# Patient Record
Sex: FEMALE | Race: ASIAN | NOT HISPANIC OR LATINO
[De-identification: names, ages, dates, MRNs, and addresses within clinical notes are randomized per-mention and may not be internally consistent; named-entity substitution may affect disease eponyms.]

---

## 2018-02-03 ENCOUNTER — TRANSCRIPTION ENCOUNTER (OUTPATIENT)
Age: 48
End: 2018-02-03

## 2019-09-26 ENCOUNTER — APPOINTMENT (OUTPATIENT)
Dept: SURGICAL ONCOLOGY | Facility: CLINIC | Age: 49
End: 2019-09-26
Payer: COMMERCIAL

## 2019-09-26 VITALS
RESPIRATION RATE: 16 BRPM | BODY MASS INDEX: 31.99 KG/M2 | SYSTOLIC BLOOD PRESSURE: 169 MMHG | OXYGEN SATURATION: 95 % | WEIGHT: 192 LBS | DIASTOLIC BLOOD PRESSURE: 117 MMHG | HEIGHT: 65 IN | HEART RATE: 72 BPM

## 2019-09-26 PROCEDURE — 99244 OFF/OP CNSLTJ NEW/EST MOD 40: CPT

## 2019-10-08 ENCOUNTER — FORM ENCOUNTER (OUTPATIENT)
Age: 49
End: 2019-10-08

## 2019-10-09 ENCOUNTER — OUTPATIENT (OUTPATIENT)
Dept: OUTPATIENT SERVICES | Facility: HOSPITAL | Age: 49
LOS: 1 days | End: 2019-10-09
Payer: COMMERCIAL

## 2019-10-09 ENCOUNTER — APPOINTMENT (OUTPATIENT)
Dept: ULTRASOUND IMAGING | Facility: CLINIC | Age: 49
End: 2019-10-09
Payer: COMMERCIAL

## 2019-10-09 DIAGNOSIS — Z00.8 ENCOUNTER FOR OTHER GENERAL EXAMINATION: ICD-10-CM

## 2019-10-09 PROCEDURE — 76536 US EXAM OF HEAD AND NECK: CPT

## 2019-10-09 PROCEDURE — 76536 US EXAM OF HEAD AND NECK: CPT | Mod: 26

## 2019-10-22 ENCOUNTER — FORM ENCOUNTER (OUTPATIENT)
Age: 49
End: 2019-10-22

## 2019-10-23 ENCOUNTER — APPOINTMENT (OUTPATIENT)
Dept: CT IMAGING | Facility: CLINIC | Age: 49
End: 2019-10-23
Payer: COMMERCIAL

## 2019-10-23 ENCOUNTER — OUTPATIENT (OUTPATIENT)
Dept: OUTPATIENT SERVICES | Facility: HOSPITAL | Age: 49
LOS: 1 days | End: 2019-10-23
Payer: COMMERCIAL

## 2019-10-23 DIAGNOSIS — R22.1 LOCALIZED SWELLING, MASS AND LUMP, NECK: ICD-10-CM

## 2019-10-23 PROCEDURE — 70491 CT SOFT TISSUE NECK W/DYE: CPT

## 2019-10-23 PROCEDURE — 70491 CT SOFT TISSUE NECK W/DYE: CPT | Mod: 26

## 2019-11-22 ENCOUNTER — APPOINTMENT (OUTPATIENT)
Dept: CT IMAGING | Facility: CLINIC | Age: 49
End: 2019-11-22

## 2019-11-29 ENCOUNTER — APPOINTMENT (OUTPATIENT)
Dept: INTERNAL MEDICINE | Facility: CLINIC | Age: 49
End: 2019-11-29
Payer: COMMERCIAL

## 2019-11-29 VITALS
WEIGHT: 196 LBS | SYSTOLIC BLOOD PRESSURE: 120 MMHG | HEART RATE: 101 BPM | BODY MASS INDEX: 32.65 KG/M2 | OXYGEN SATURATION: 98 % | TEMPERATURE: 97.6 F | HEIGHT: 65 IN | DIASTOLIC BLOOD PRESSURE: 84 MMHG

## 2019-11-29 DIAGNOSIS — Z80.0 FAMILY HISTORY OF MALIGNANT NEOPLASM OF DIGESTIVE ORGANS: ICD-10-CM

## 2019-11-29 DIAGNOSIS — Z90.711 ACQUIRED ABSENCE OF UTERUS WITH REMAINING CERVICAL STUMP: ICD-10-CM

## 2019-11-29 DIAGNOSIS — R73.03 PREDIABETES.: ICD-10-CM

## 2019-11-29 DIAGNOSIS — Z80.7 FAMILY HISTORY OF OTHER MALIGNANT NEOPLASMS OF LYMPHOID, HEMATOPOIETIC AND RELATED TISSUES: ICD-10-CM

## 2019-11-29 PROCEDURE — G0442 ANNUAL ALCOHOL SCREEN 15 MIN: CPT

## 2019-11-29 PROCEDURE — G0444 DEPRESSION SCREEN ANNUAL: CPT

## 2019-11-29 PROCEDURE — 99386 PREV VISIT NEW AGE 40-64: CPT | Mod: 25

## 2019-11-29 NOTE — HEALTH RISK ASSESSMENT
[Good] : ~his/her~  mood as  good [Yes] : Yes [Monthly or less (1 pt)] : Monthly or less (1 point) [1 or 2 (0 pts)] : 1 or 2 (0 points) [Never (0 pts)] : Never (0 points) [No falls in past year] : Patient reported no falls in the past year [0] : 2) Feeling down, depressed, or hopeless: Not at all (0) [Patient reported mammogram was normal] : Patient reported mammogram was normal [Patient reported colonoscopy was normal] : Patient reported colonoscopy was normal [Patient reported PAP Smear was normal] : Patient reported PAP Smear was normal [Alone] : lives alone [# Of Children ___] : has [unfilled] children [Employed] : employed [] :  [Fully functional (using the telephone, shopping, preparing meals, housekeeping, doing laundry, using] : Fully functional and needs no help or supervision to perform IADLs (using the telephone, shopping, preparing meals, housekeeping, doing laundry, using transportation, managing medications and managing finances) [Fully functional (bathing, dressing, toileting, transferring, walking, feeding)] : Fully functional (bathing, dressing, toileting, transferring, walking, feeding) [] : No [de-identified] : sedentary  [Reports changes in hearing] : Reports no changes in hearing [Reports changes in dental health] : Reports no changes in dental health [MammogramDate] : 11/11/19 [Reports changes in vision] : Reports no changes in vision [ColonoscopyDate] : 2017  [ColonoscopyComments] : d [MammogramComments] : Dr Pa  [PapSmearDate] : 2018 [FreeTextEntry2] :

## 2019-11-29 NOTE — HISTORY OF PRESENT ILLNESS
[de-identified] : new pt \par need to estabilish care \par \par was seeing ortho in summer Dr Ken hickman for cspine pain did MRi showed incidental spot on neck left - saw surgery onocologist - Dr Espinal - did CT neck with contarast - soft tissue density 1.7/ 1 cm -with fatty hilum likely small LN recommended MRI with contrast has appt 12/7th

## 2019-11-29 NOTE — ASSESSMENT
[FreeTextEntry1] : BMI -32 / predm \par -BW report requested pt did last month whole panel with old doctor \par - discussed caloric control , portion control , weight loss, increase physical activity\par \par Left neck mass- keep appt for MRI neck with contrast next lorene \par -followed by Surgery \par \par HCM \par PAP- 2019 up to date as per pt \par mammo 10/2019 - negative \par Flu vaccine 10/2019 \par Colonoscope 2017 neg as per pt records requested \par tdap - will check and inform me \par \par rtc 2 months

## 2019-11-29 NOTE — PHYSICAL EXAM
[No Acute Distress] : no acute distress [Well Nourished] : well nourished [Well-Appearing] : well-appearing [Well Developed] : well developed [Normal Sclera/Conjunctiva] : normal sclera/conjunctiva [PERRL] : pupils equal round and reactive to light [EOMI] : extraocular movements intact [Normal Outer Ear/Nose] : the outer ears and nose were normal in appearance [Normal Oropharynx] : the oropharynx was normal [No Lymphadenopathy] : no lymphadenopathy [No JVD] : no jugular venous distention [Supple] : supple [No Respiratory Distress] : no respiratory distress  [Thyroid Normal, No Nodules] : the thyroid was normal and there were no nodules present [No Accessory Muscle Use] : no accessory muscle use [Clear to Auscultation] : lungs were clear to auscultation bilaterally [Normal Rate] : normal rate  [Regular Rhythm] : with a regular rhythm [No Carotid Bruits] : no carotid bruits [No Murmur] : no murmur heard [Normal S1, S2] : normal S1 and S2 [No Abdominal Bruit] : a ~M bruit was not heard ~T in the abdomen [Pedal Pulses Present] : the pedal pulses are present [No Varicosities] : no varicosities [No Extremity Clubbing/Cyanosis] : no extremity clubbing/cyanosis [No Edema] : there was no peripheral edema [No Palpable Aorta] : no palpable aorta [Non Tender] : non-tender [Soft] : abdomen soft [Non-distended] : non-distended [No HSM] : no HSM [No Masses] : no abdominal mass palpated [Normal Bowel Sounds] : normal bowel sounds [Normal Posterior Cervical Nodes] : no posterior cervical lymphadenopathy [No Spinal Tenderness] : no spinal tenderness [No CVA Tenderness] : no CVA  tenderness [Normal Anterior Cervical Nodes] : no anterior cervical lymphadenopathy [Grossly Normal Strength/Tone] : grossly normal strength/tone [No Joint Swelling] : no joint swelling [No Rash] : no rash [Coordination Grossly Intact] : coordination grossly intact [No Focal Deficits] : no focal deficits [Normal Gait] : normal gait [Deep Tendon Reflexes (DTR)] : deep tendon reflexes were 2+ and symmetric [Normal Affect] : the affect was normal [Normal Insight/Judgement] : insight and judgment were intact

## 2019-12-06 ENCOUNTER — FORM ENCOUNTER (OUTPATIENT)
Age: 49
End: 2019-12-06

## 2019-12-07 ENCOUNTER — OUTPATIENT (OUTPATIENT)
Dept: OUTPATIENT SERVICES | Facility: HOSPITAL | Age: 49
LOS: 1 days | End: 2019-12-07
Payer: COMMERCIAL

## 2019-12-07 ENCOUNTER — APPOINTMENT (OUTPATIENT)
Dept: MRI IMAGING | Facility: CLINIC | Age: 49
End: 2019-12-07
Payer: COMMERCIAL

## 2019-12-07 DIAGNOSIS — R22.1 LOCALIZED SWELLING, MASS AND LUMP, NECK: ICD-10-CM

## 2019-12-07 PROCEDURE — A9585: CPT

## 2019-12-07 PROCEDURE — 70543 MRI ORBT/FAC/NCK W/O &W/DYE: CPT | Mod: 26

## 2019-12-07 PROCEDURE — 70543 MRI ORBT/FAC/NCK W/O &W/DYE: CPT

## 2020-06-22 ENCOUNTER — APPOINTMENT (OUTPATIENT)
Dept: INTERNAL MEDICINE | Facility: CLINIC | Age: 50
End: 2020-06-22

## 2020-07-13 ENCOUNTER — APPOINTMENT (OUTPATIENT)
Dept: CT IMAGING | Facility: CLINIC | Age: 50
End: 2020-07-13
Payer: COMMERCIAL

## 2020-07-13 ENCOUNTER — OUTPATIENT (OUTPATIENT)
Dept: OUTPATIENT SERVICES | Facility: HOSPITAL | Age: 50
LOS: 1 days | End: 2020-07-13
Payer: COMMERCIAL

## 2020-07-13 ENCOUNTER — RESULT REVIEW (OUTPATIENT)
Age: 50
End: 2020-07-13

## 2020-07-13 DIAGNOSIS — R22.1 LOCALIZED SWELLING, MASS AND LUMP, NECK: ICD-10-CM

## 2020-07-13 PROCEDURE — 70491 CT SOFT TISSUE NECK W/DYE: CPT

## 2020-07-13 PROCEDURE — 70491 CT SOFT TISSUE NECK W/DYE: CPT | Mod: 26

## 2020-07-29 DIAGNOSIS — J30.2 OTHER SEASONAL ALLERGIC RHINITIS: ICD-10-CM

## 2020-07-29 DIAGNOSIS — Z82.49 FAMILY HISTORY OF ISCHEMIC HEART DISEASE AND OTHER DISEASES OF THE CIRCULATORY SYSTEM: ICD-10-CM

## 2020-07-29 DIAGNOSIS — Z80.0 FAMILY HISTORY OF MALIGNANT NEOPLASM OF DIGESTIVE ORGANS: ICD-10-CM

## 2020-07-30 ENCOUNTER — APPOINTMENT (OUTPATIENT)
Dept: CV DIAGNOSITCS | Facility: HOSPITAL | Age: 50
End: 2020-07-30

## 2020-07-30 ENCOUNTER — OUTPATIENT (OUTPATIENT)
Dept: OUTPATIENT SERVICES | Facility: HOSPITAL | Age: 50
LOS: 1 days | End: 2020-07-30
Payer: COMMERCIAL

## 2020-07-30 ENCOUNTER — APPOINTMENT (OUTPATIENT)
Dept: CARDIOLOGY | Facility: CLINIC | Age: 50
End: 2020-07-30
Payer: COMMERCIAL

## 2020-07-30 ENCOUNTER — NON-APPOINTMENT (OUTPATIENT)
Age: 50
End: 2020-07-30

## 2020-07-30 VITALS
OXYGEN SATURATION: 99 % | RESPIRATION RATE: 16 BRPM | SYSTOLIC BLOOD PRESSURE: 145 MMHG | DIASTOLIC BLOOD PRESSURE: 98 MMHG | WEIGHT: 191 LBS | HEART RATE: 71 BPM | TEMPERATURE: 98.2 F | BODY MASS INDEX: 31.78 KG/M2

## 2020-07-30 DIAGNOSIS — R00.2 PALPITATIONS: ICD-10-CM

## 2020-07-30 DIAGNOSIS — R94.31 ABNORMAL ELECTROCARDIOGRAM [ECG] [EKG]: ICD-10-CM

## 2020-07-30 PROCEDURE — 93000 ELECTROCARDIOGRAM COMPLETE: CPT

## 2020-07-30 PROCEDURE — 93306 TTE W/DOPPLER COMPLETE: CPT | Mod: 26

## 2020-07-30 PROCEDURE — 99205 OFFICE O/P NEW HI 60 MIN: CPT

## 2021-04-21 ENCOUNTER — APPOINTMENT (OUTPATIENT)
Dept: CARDIOLOGY | Facility: CLINIC | Age: 51
End: 2021-04-21
Payer: COMMERCIAL

## 2021-04-21 VITALS
SYSTOLIC BLOOD PRESSURE: 140 MMHG | TEMPERATURE: 97 F | OXYGEN SATURATION: 97 % | DIASTOLIC BLOOD PRESSURE: 94 MMHG | HEART RATE: 73 BPM | WEIGHT: 200 LBS | BODY MASS INDEX: 33.28 KG/M2 | RESPIRATION RATE: 16 BRPM

## 2021-04-21 DIAGNOSIS — I49.9 CARDIAC ARRHYTHMIA, UNSPECIFIED: ICD-10-CM

## 2021-04-21 DIAGNOSIS — R07.9 CHEST PAIN, UNSPECIFIED: ICD-10-CM

## 2021-04-21 DIAGNOSIS — R94.31 ABNORMAL ELECTROCARDIOGRAM [ECG] [EKG]: ICD-10-CM

## 2021-04-21 DIAGNOSIS — R06.02 SHORTNESS OF BREATH: ICD-10-CM

## 2021-04-21 DIAGNOSIS — Z13.6 ENCOUNTER FOR SCREENING FOR CARDIOVASCULAR DISORDERS: ICD-10-CM

## 2021-04-21 DIAGNOSIS — R00.2 PALPITATIONS: ICD-10-CM

## 2021-04-21 DIAGNOSIS — R09.89 OTHER SPECIFIED SYMPTOMS AND SIGNS INVOLVING THE CIRCULATORY AND RESPIRATORY SYSTEMS: ICD-10-CM

## 2021-04-21 DIAGNOSIS — I10 ESSENTIAL (PRIMARY) HYPERTENSION: ICD-10-CM

## 2021-04-21 PROCEDURE — 99215 OFFICE O/P EST HI 40 MIN: CPT

## 2021-04-21 PROCEDURE — 99072 ADDL SUPL MATRL&STAF TM PHE: CPT

## 2021-04-21 PROCEDURE — 93000 ELECTROCARDIOGRAM COMPLETE: CPT

## 2021-04-29 PROBLEM — I10 HYPERTENSION: Status: ACTIVE | Noted: 2020-07-29

## 2021-04-29 PROBLEM — R94.31 ABNORMAL EKG: Status: ACTIVE | Noted: 2020-07-30

## 2021-04-29 PROBLEM — R00.2 PALPITATION: Status: ACTIVE | Noted: 2020-07-29

## 2021-04-29 PROBLEM — R06.02 SOB (SHORTNESS OF BREATH) ON EXERTION: Status: ACTIVE | Noted: 2020-07-29

## 2021-04-29 PROBLEM — R07.9 CHEST PAIN: Status: ACTIVE | Noted: 2020-07-29

## 2021-04-29 PROBLEM — I49.9 IRREGULAR HEARTBEAT: Status: ACTIVE | Noted: 2020-07-29

## 2021-04-29 PROBLEM — Z13.6 SCREENING FOR CARDIOVASCULAR CONDITION: Status: ACTIVE | Noted: 2021-04-29

## 2021-05-15 ENCOUNTER — APPOINTMENT (OUTPATIENT)
Dept: CT IMAGING | Facility: CLINIC | Age: 51
End: 2021-05-15
Payer: COMMERCIAL

## 2021-05-15 ENCOUNTER — OUTPATIENT (OUTPATIENT)
Dept: OUTPATIENT SERVICES | Facility: HOSPITAL | Age: 51
LOS: 1 days | End: 2021-05-15
Payer: COMMERCIAL

## 2021-05-15 DIAGNOSIS — Z00.8 ENCOUNTER FOR OTHER GENERAL EXAMINATION: ICD-10-CM

## 2021-05-15 DIAGNOSIS — R22.1 LOCALIZED SWELLING, MASS AND LUMP, NECK: ICD-10-CM

## 2021-05-15 PROCEDURE — 70491 CT SOFT TISSUE NECK W/DYE: CPT | Mod: 26

## 2021-05-15 PROCEDURE — 70491 CT SOFT TISSUE NECK W/DYE: CPT

## 2021-05-27 NOTE — HISTORY OF PRESENT ILLNESS
[de-identified] : 48-year-old lady referred by her orthopedist Dr. Collin JOYNER for evaluation of an incidental LEFT RETRO-CLAVICULAR MASS found as an incidental finding on an MRI of the Left neck and shoulder performed @ [facility]; study was being performed to evaluate left Shoulder pain.\par Findings of supraspinatus tendinosis with high-grade partial thickness articular surface insertional tear of the rotator cuff, and insertional infraspinatus tendinosis, with tendinosis of the biceps, and joint arthrosis of the acromioclavicular joint likely explain her symptoms\par \par No preceding injury,or straining.\par \par No constitutional signs or symptoms.\par \par No other specific areas of concern.\par No palpable lumps or bumps presently, or in the past, on self examination.\par \par No personal history of malignancy.\par \par +FH:\par A sister had lymphoma.\par \par Her mother had colon cancer.\par Her own colonoscopy will be needed in 2020 by Dr. Bry Christian in [facility]\par \par No other relatives with a history of malignancy.\par \par Her internist is Dr. Devonte RYAN\par \par No pacemaker or defibrillator.\par No anticoagulants.\par \par No prescription medications.\par \par Recently diagnosed elevated blood pressure being monitored, with attention to diet modification, and increased physical activity.\par \par No other medical problems.\par \par Her gynecologist is Dr. Wendy CADET in [place].\par Summary 2018 this was unremarkable.\par \par Breast imaging his every fall in [place], she'll be scheduling this visit.

## 2021-05-27 NOTE — REASON FOR VISIT
[Initial Consultation] : an initial consultation for [Other: _____] : [unfilled] [FreeTextEntry2] : Incidental left neck mass on MRI imaging performed to evaluate cervical pain

## 2021-05-27 NOTE — PHYSICAL EXAM
[Normal] : supple, no neck mass and thyroid not enlarged [Normal Neck Lymph Nodes] : normal neck lymph nodes  [Normal Supraclavicular Lymph Nodes] : normal supraclavicular lymph nodes [Normal Axillary Lymph Nodes] : normal axillary lymph nodes [Normal] : normal appearance, no rash, nodules, vesicles, ulcers, erythema [de-identified] : groins not examined

## 2021-05-27 NOTE — ASSESSMENT
[FreeTextEntry1] : Discussed the findings on her recent neck And shoulder MRI: 20 mm, T2 hyperintense nodule posterior to the left clavicle thought to perhaps represent a prominent supraclavicular lymph node\par Radiology I suggested a CT of the neck, however I suggested a cervical ultrasound for initial evaluation.\par I explained the rationale, she understands and agrees.\par Prescription entered.\par \par Further management will be based on the results of the sonographic evaluation of the area of concern\par \par \par Reviewed in detail, all questions answered.\par \par \par (10-10-19.\par I called her but had to leave a voicemail.\par Yesterday, she had a neck ultrasound at 611.\par That study demonstrated no mass or adenopathy in the left medial subclavicular region.\par Our radiologist recommended an MRI of the thoracic inlet.\par August 30, 2019, she had a cervical spine MRI (albeit without intravenous contrast) which had originally identified the lesion; that radiologist had recommended a CT of the neck.\par Therefore, radiographic imaging has been requested, and if still nondiagnostic the MRI will be repeated with intravenous contrast...(Below)\par Prescription entered)\par \par \par (11-04-19:\par We spoke.\par October 23, 2019, she had a CT of the neck, with contrast at 611.\par The study demonstrates a "1.7 x 0.9 x 1.0 cm soft tissue density with tiny fat hilum likely a small lymph node. Evaluation is partially degraded by adjacent artifact from contrast injection.".\par Since the above information is favorable, inconclusive, I suggested that we perform an MRI with contrast.\par She understands and agrees.\par Prescription entered)\par \par 12-24-19.\par I called her but had to leave a voice mail.\par Her December 7, 2019, MRI of the neck failed to demonstrate any abnormality corresponded to the original retroclavicular finding described above.\par Since the area was best delineated on neck CT scan, I have suggested a 6 month followup interval reassessment with that modality, and entered a prescription for June 2020\par \par Note dictated\par \par \par January 6, 2020.\par We spoke.\par I reviewed the above imaging information, the plan for followup study.\par She will contact orthopedics (Dr. Collin Connelly, to discuss the persistent neck pain\par \par \par 07-13-20:\par CT neck, at 450: No change in left retroclavicular lymph node (1.7 x 0.9 x 1.0 cm).\par Marta gave her the results on July 23, 2020.\par I entered a prescription on July 23 for a 9 month followup CT neck. April 2021.\par \par \par \par 5/27/2021.\par She and I spoke.\par May 15, 2021, she had a follow-up CT scan of her neck at 450.\par The left supraclavicular node is stable and unchanged from October 2019.\par This was a 9-month interval study.\par She remains asymptomatic.\par Prescription entered to have her imaging repeated at a 1 year interval, May 2022

## 2021-08-18 ENCOUNTER — APPOINTMENT (OUTPATIENT)
Dept: CARDIOLOGY | Facility: CLINIC | Age: 51
End: 2021-08-18
Payer: COMMERCIAL

## 2021-08-18 PROCEDURE — 93351 STRESS TTE COMPLETE: CPT

## 2021-08-18 PROCEDURE — 93325 DOPPLER ECHO COLOR FLOW MAPG: CPT

## 2021-08-18 PROCEDURE — 93320 DOPPLER ECHO COMPLETE: CPT

## 2021-12-29 DIAGNOSIS — Z00.00 ENCOUNTER FOR GENERAL ADULT MEDICAL EXAMINATION W/OUT ABNORMAL FINDINGS: ICD-10-CM

## 2022-01-30 ENCOUNTER — EMERGENCY (EMERGENCY)
Facility: HOSPITAL | Age: 52
LOS: 1 days | Discharge: HOME | End: 2022-01-30
Attending: CLINIC/CENTER
Payer: COMMERCIAL

## 2022-01-30 VITALS
HEART RATE: 89 BPM | OXYGEN SATURATION: 96 % | SYSTOLIC BLOOD PRESSURE: 153 MMHG | HEIGHT: 65 IN | DIASTOLIC BLOOD PRESSURE: 87 MMHG | TEMPERATURE: 98 F | RESPIRATION RATE: 16 BRPM | WEIGHT: 199.96 LBS

## 2022-01-30 VITALS
TEMPERATURE: 98 F | OXYGEN SATURATION: 98 % | HEART RATE: 76 BPM | RESPIRATION RATE: 16 BRPM | DIASTOLIC BLOOD PRESSURE: 90 MMHG | SYSTOLIC BLOOD PRESSURE: 136 MMHG

## 2022-01-30 PROCEDURE — 93971 EXTREMITY STUDY: CPT | Mod: 26,LT

## 2022-01-30 PROCEDURE — 99284 EMERGENCY DEPT VISIT MOD MDM: CPT

## 2022-01-30 RX ORDER — LIDOCAINE 4 G/100G
1 CREAM TOPICAL ONCE
Refills: 0 | Status: COMPLETED | OUTPATIENT
Start: 2022-01-30 | End: 2022-01-30

## 2022-01-30 RX ORDER — IBUPROFEN 200 MG
600 TABLET ORAL ONCE
Refills: 0 | Status: COMPLETED | OUTPATIENT
Start: 2022-01-30 | End: 2022-01-30

## 2022-01-30 RX ORDER — ACETAMINOPHEN 500 MG
975 TABLET ORAL ONCE
Refills: 0 | Status: COMPLETED | OUTPATIENT
Start: 2022-01-30 | End: 2022-01-30

## 2022-01-30 RX ADMIN — LIDOCAINE 1 PATCH: 4 CREAM TOPICAL at 17:36

## 2022-01-30 RX ADMIN — Medication 600 MILLIGRAM(S): at 17:35

## 2022-01-30 RX ADMIN — Medication 975 MILLIGRAM(S): at 17:35

## 2022-01-30 NOTE — ED PROVIDER NOTE - NSFOLLOWUPINSTRUCTIONS_ED_ALL_ED_FT
1) Follow-up with your PCP in 2-3 days.      Call the spine center at [phone] for a Follow-up if your pain does not improve.      Bring all printed results to discuss with your provider.    2) Avoid strenuous activity but do not strictly rest in bed. Move around throughout the day to prevent the back from becoming more stiff. Use warm compresses.    3) Pain can be managed with Tylenol 1000mg (2 extra strength tablets) every 8 hours and/or ibuprofen  600mg (3 regular strength tablets) every 8 hours. Apply an over the counter lidocaine patch to the area, use as directed.    4) Continue to take all other medications as directed.    5) Return to the emergency room immediately if your symptoms worsen or persist, fever, new or worsening pain in the affected area, numbness, tingling, weakness, difficulty walking, weight loss, redness of the back, abdominal pain, vomiting, incontinence (pooping or peeing yourself), or if any other concerning or questionable symptoms.

## 2022-01-30 NOTE — ED PROVIDER NOTE - ATTENDING CONTRIBUTION TO CARE
None Agree with above except noted:     left leg pain worse with movement. no OCP, trauma, back surgery, AC meds use. ambulating in the ER. nontoxic appearing, NAD, non tachycardiac, non tachypnea, non hypoxic, DP/PT b/l. paresthesia of the posterior leg and the web of first and second toe on the left. will symptomatic/pain control at this time. no sign of erythema or pain w. palpation on the posterior left leg. recent covid, will get LE DVT. no sign of PE or COLLETTE. symptom consistent with sciatic. will d/c with ortho/spine if neg DVT.

## 2022-01-30 NOTE — ED PROVIDER NOTE - PROGRESS NOTE DETAILS
Dagoberto Mcclure PA-C: pt feeling better. dvt study negative. All results and dc instructions reviewed with pt. Return precautions given.

## 2022-01-30 NOTE — ED PROVIDER NOTE - PATIENT PORTAL LINK FT
You can access the FollowMyHealth Patient Portal offered by [facility] by registering at the following website: [email or web address]. By joining [employer]’s FollowMyHealth portal, you will also be able to view your health information using other applications (apps) compatible with our system.

## 2022-01-30 NOTE — ED PROVIDER NOTE - PHYSICAL EXAMINATION
GEN: Pt well appearing, non-toxic in NAD, mildly anxious, A&Ox3.  PSYCH: Affect and mood appropriate.  EYES: Sclera white w/o injection.  ENT: Airway patent.  RESP: CTA b/l, no wheezes, rales, or rhonchi.   CARDIAC: RRR, clear distinct S1, S2, no appreciable murmurs.  ABD: Abdomen soft, non-tender. No CVAT b/l.  MSK: No obvious spinal deformity, no midline spinal ttp, +L lumbosacral paraspinal ttp, no step-offs. FROM b/l UE and LE w/o pain.   NEURO: No focal motor or sensory deficits. +L SLR. Normal gait.  VASC: Radial and dorsalis pedis pulses 2+ b/l. No edema or calf tenderness. No palpable cord.  SKIN: No rashes or lesions.

## 2022-01-30 NOTE — ED ADULT NURSE REASSESSMENT NOTE - NS ED NURSE REASSESS COMMENT FT1
Patient d/c. Reviewed d/c paperwork with patients, all questions answered at this time. Patient verbalizes understanding. Patient instructed to return to the ER for any worsening s/s including chest pain, SOB, fever, n/v/d. Patient alert and stable at time of d/c. Patient will follow up with spine center.

## 2022-01-30 NOTE — ED PROVIDER NOTE - OBJECTIVE STATEMENT
51y F pmhx HTN on amlodipine, COVID+ 10 days ago tested neg today at [facility], presents to ED c/o atraumatic low back pain and LLE pain x 3 days. First noticed pain after waking up Friday morning became worse after sleeping in car for an hour after getting to work early. Pain radiates down L leg and stops at calf. Went to  today and referred to ED to r/o DVT. No personal or fhx of dvt/pe, never smoker, no OC or hormone use, no extensive travel. Denies f/c, sob, cp.

## 2022-01-30 NOTE — ED ADULT NURSE NOTE - OBJECTIVE STATEMENT
Patient is a 52 y/o female c/o L leg pain since Friday. States pain starts in posterior thigh and radiates down her leg. Denies any recent trauma/fall/injury. Patient a/o x 3, able to verbalize needs and follow commands. Patient ambulatory with steady gait. Denies hx of smoking, denies use of birth control, denies recent surgeries. Patient sent by  to r/o DVT of left leg. Patient examined by MD, given pain meds as ordered. Pending U/S of L leg. Will continue to monitor.

## 2022-02-01 ENCOUNTER — APPOINTMENT (OUTPATIENT)
Age: 52
End: 2022-02-01
Payer: COMMERCIAL

## 2022-02-01 ENCOUNTER — NON-APPOINTMENT (OUTPATIENT)
Age: 52
End: 2022-02-01

## 2022-02-01 VITALS
HEIGHT: 65 IN | HEART RATE: 77 BPM | DIASTOLIC BLOOD PRESSURE: 97 MMHG | OXYGEN SATURATION: 95 % | BODY MASS INDEX: 33.32 KG/M2 | WEIGHT: 200 LBS | SYSTOLIC BLOOD PRESSURE: 149 MMHG

## 2022-02-01 PROCEDURE — 72110 X-RAY EXAM L-2 SPINE 4/>VWS: CPT

## 2022-02-01 PROCEDURE — 99204 OFFICE O/P NEW MOD 45 MIN: CPT

## 2022-02-01 RX ORDER — TIZANIDINE 2 MG/1
2 TABLET ORAL EVERY 6 HOURS
Qty: 24 | Refills: 1 | Status: ACTIVE | COMMUNITY
Start: 2022-02-01 | End: 1900-01-01

## 2022-02-01 NOTE — PHYSICAL EXAM
[de-identified] : Lumbar Physical Exam\par \par Gait -antalgic gait\par \par Station - Normal\par \par Sagittal balance - Normal\par \par Compensatory mechanism? - None\par \par \par Reflexes\par Patellar - normal\par Gastroc - normal\par Clonus - No\par \par Hip Exam - Normal\par \par Straight leg raise -positive on the left\par Pulses - 2+ dp/pt\par \par Range of motion - normal\par \par Sensation \par Sensation intact to light touch in L1, L2, L3, L4, L5 and S1 dermatomes bilaterally\par \par Motor\par 	IP	Quad	HS	TA	Gastroc	EHL\par Right	5/5	5/5	5/5	5/5	5/5	5/5\par Left	4/5	5/5	5/5	5/5	5/5	5/5 [de-identified] : Lumbar radiographs\par L4-L5 spondylolisthesis noted with motion on flexion-extension radiographs\par Significant disc degeneration at L4-L5, worse at L5-S1

## 2022-02-01 NOTE — HISTORY OF PRESENT ILLNESS
[de-identified] : This is a 51-year-old female that is here today for evaluation of her low back and leg symptoms.  She states that she has had the symptoms on and off for 2 to 3 years.  Unfortunate over the past several days she has had acute worsening of significant pain down her left posterior thigh and at times into her posterior calf.  The symptoms are disabling.  There interfering with her quality of life.  She denies any bowel bladder issues.  She denies any saddle anesthesia.  She does have decreased walking tolerance as well.

## 2022-02-15 ENCOUNTER — FORM ENCOUNTER (OUTPATIENT)
Age: 52
End: 2022-02-15

## 2022-02-25 ENCOUNTER — APPOINTMENT (OUTPATIENT)
Dept: ORTHOPEDIC SURGERY | Facility: CLINIC | Age: 52
End: 2022-02-25

## 2022-04-24 ENCOUNTER — APPOINTMENT (OUTPATIENT)
Dept: MRI IMAGING | Facility: CLINIC | Age: 52
End: 2022-04-24
Payer: COMMERCIAL

## 2022-04-24 ENCOUNTER — OUTPATIENT (OUTPATIENT)
Dept: OUTPATIENT SERVICES | Facility: HOSPITAL | Age: 52
LOS: 1 days | End: 2022-04-24
Payer: COMMERCIAL

## 2022-04-24 DIAGNOSIS — M54.9 DORSALGIA, UNSPECIFIED: ICD-10-CM

## 2022-04-24 PROCEDURE — 72148 MRI LUMBAR SPINE W/O DYE: CPT | Mod: 26

## 2022-04-24 PROCEDURE — 72148 MRI LUMBAR SPINE W/O DYE: CPT

## 2022-04-25 ENCOUNTER — APPOINTMENT (OUTPATIENT)
Dept: SURGICAL ONCOLOGY | Facility: CLINIC | Age: 52
End: 2022-04-25
Payer: COMMERCIAL

## 2022-04-25 VITALS
OXYGEN SATURATION: 97 % | SYSTOLIC BLOOD PRESSURE: 137 MMHG | TEMPERATURE: 98.6 F | BODY MASS INDEX: 33.32 KG/M2 | DIASTOLIC BLOOD PRESSURE: 90 MMHG | WEIGHT: 200 LBS | HEIGHT: 65 IN | HEART RATE: 74 BPM | RESPIRATION RATE: 16 BRPM

## 2022-04-25 PROCEDURE — 99214 OFFICE O/P EST MOD 30 MIN: CPT

## 2022-04-25 NOTE — REASON FOR VISIT
[Follow-Up Visit] : a follow-up visit for [Other: _____] : [unfilled] [FreeTextEntry2] : Left retroclavicular mass

## 2022-04-25 NOTE — HISTORY OF PRESENT ILLNESS
[de-identified] : 51-year-old lady\par \par Only seen once previously: September 2019.\par \par She had been referred by orthopedics (below) for evaluation and management of a nonpalpable, asymptomatic, left retroclavicular mass identified on an MRI of the neck and shoulder, performed to evaluate shoulder pain.\par \par My PE:\par No visible or palpable abnormality in the area of the left clavicle.\par No tenderness.\par \par October 2019:\par Targeted ultrasound at 611:\par No mass or adenopathy.\par \par October 2019:\par CT neck 611:\par 1.7 x 0.9 x 1.0 cm soft tissue density, thought to represent a benign lymph node.\par \par December 2019:\par Neck MRI with contrast at 611:\par The above CT finding was not duplicated.\par \par June 2020:\par Follow-up CT neck at 450:\par Stable left retroclavicular lymph node.\par \par May 2021:\par Follow-up CT neck at 450: No change in left retroclavicular lymph node dating back to October 2019.\par \par Verify prescription for May 2022.\par \par \par She returns for interval follow-up today.\par \par CC: She is asymptomatic\par \par \par September 2019:\par She was referred by her orthopedist Dr. Collin JOYNER for evaluation of an incidental LEFT RETRO-CLAVICULAR MASS found as an incidental finding on an MRI of the Left neck and shoulder performed @ [facility]; study was being performed to evaluate left Shoulder pain.\par Findings of supraspinatus tendinosis with high-grade partial thickness articular surface insertional tear of the rotator cuff, and insertional infraspinatus tendinosis, with tendinosis of the biceps, and joint arthrosis of the acromioclavicular joint likely explain her symptoms\par \par No preceding injury,or straining.\par \par No constitutional signs or symptoms.\par \par No other specific areas of concern.\par No palpable lumps or bumps presently, or in the past, on self examination.\par \par \par No personal history of malignancy.\par \par +FH:\par A sister had lymphoma.\par \par Her mother had colon cancer.\par Her own colonoscopy was d done November 2021 by Dr. Bry Christian in [facility]\par \par No other relatives with a history of malignancy.\par \par \par PMD:\par Dr. Javy MURDOCK.\par Her internist was Dr. Devonte RYAN\par \par No pacemaker or defibrillator.\par No anticoagulants.\par \par Cardiology: Dr. Andrew CARTER.\par History of atypical chest pain, palpitations.\par \par + Hypertension.\par Treated with amlodipine\par \par \par Her gynecologist is Dr. Wendy CADET in [place].\par 2021 visit was unremarkable was unremarkable.\par \par Breast imaging his every fall in [place].\par November 2021 studies were unremarkable\par \par \par Colonoscopy:\par + FH: Mother had CRC.\par November 2021 colonoscopy was normal

## 2022-04-25 NOTE — ASSESSMENT
[FreeTextEntry1] : Clinically doing well.\par \par Asymptomatic.\par \par Referred in the fall 2019 with incidental, asymptomatic, left retroclavicular adenopathy.\par This has been stable on serial imaging exams.\par She is scheduled for a follow-up CT scan of the neck in May 2022.\par Prescription verified.\par \par I have asked her to call me 1 week after the imaging to discuss the results.\par \par If her imaging is normal, and she remains asymptomatic, we should see her in another year, sooner if needed.\par \par Reviewed in detail, all questions answered.

## 2022-04-25 NOTE — REVIEW OF SYSTEMS
[FreeTextEntry5] : Atypical chest pain [FreeTextEntry8] : Family history of CRC [de-identified] : Prediabetes [FreeTextEntry1] : Adenopathy

## 2022-05-05 ENCOUNTER — APPOINTMENT (OUTPATIENT)
Dept: ORTHOPEDIC SURGERY | Facility: CLINIC | Age: 52
End: 2022-05-05
Payer: COMMERCIAL

## 2022-05-05 DIAGNOSIS — M54.9 DORSALGIA, UNSPECIFIED: ICD-10-CM

## 2022-05-05 PROCEDURE — 99214 OFFICE O/P EST MOD 30 MIN: CPT

## 2022-05-05 NOTE — ASSESSMENT
[FreeTextEntry1] : I had a long discussion with the patient in regards to her treatment plan and diagnosis.  We discussed pros and cons of various treatment modalities including operative and nonoperative modalities of care.  At this point she would like to pursue conservative treatment.  I think this is reasonable.  She will proceed with further physical therapy.  I will have her follow-up in 2 months.  If she does have continued symptoms in 2 months she may be a candidate for an epidural steroid injection.  The patient was in agreement with this plan.

## 2022-05-05 NOTE — PHYSICAL EXAM
[de-identified] : Lumbar Physical Exam\par \par Gait -antalgic gait\par \par Station - Normal\par \par Sagittal balance - Normal\par \par Compensatory mechanism? - None\par \par \par Reflexes\par Patellar - normal\par Gastroc - normal\par Clonus - No\par \par Hip Exam - Normal\par \par Straight leg raise -positive on the left\par Pulses - 2+ dp/pt\par \par Range of motion - normal\par \par Sensation \par Sensation intact to light touch in L1, L2, L3, L4, L5 and S1 dermatomes bilaterally\par \par Motor\par 	IP	Quad	HS	TA	Gastroc	EHL\par Right	5/5	5/5	5/5	5/5	5/5	5/5\par Left	4/5	5/5	5/5	5/5	5/5	5/5 [de-identified] : Lumbar radiographs\par L4-L5 spondylolisthesis noted with motion on flexion-extension radiographs\par Significant disc degeneration at L4-L5, worse at L5-S1\par \par Lumbar MRI reviewed\par Lumbar spondylosis noted\par Bilateral lateral recess stenosis, left worse than right at L4-L5

## 2022-05-05 NOTE — HISTORY OF PRESENT ILLNESS
[de-identified] : Today the patient states that overall she is doing better.  She does however still have left-sided posterior lateral thigh posterior lateral calf symptoms.  She does describe tingling in this dermatome as well.  She denies any bowel bladder issues.  She denies any saddle anesthesia.\par \par 02/01/22\par This is a 51-year-old female that is here today for evaluation of her low back and leg symptoms.  She states that she has had the symptoms on and off for 2 to 3 years.  Unfortunate over the past several days she has had acute worsening of significant pain down her left posterior thigh and at times into her posterior calf.  The symptoms are disabling.  There interfering with her quality of life.  She denies any bowel bladder issues.  She denies any saddle anesthesia.  She does have decreased walking tolerance as well.

## 2022-07-07 ENCOUNTER — APPOINTMENT (OUTPATIENT)
Dept: ORTHOPEDIC SURGERY | Facility: CLINIC | Age: 52
End: 2022-07-07

## 2022-07-25 ENCOUNTER — APPOINTMENT (OUTPATIENT)
Dept: CT IMAGING | Facility: CLINIC | Age: 52
End: 2022-07-25

## 2022-07-25 ENCOUNTER — RESULT REVIEW (OUTPATIENT)
Age: 52
End: 2022-07-25

## 2022-07-25 ENCOUNTER — OUTPATIENT (OUTPATIENT)
Dept: OUTPATIENT SERVICES | Facility: HOSPITAL | Age: 52
LOS: 1 days | End: 2022-07-25
Payer: COMMERCIAL

## 2022-07-25 DIAGNOSIS — R22.1 LOCALIZED SWELLING, MASS AND LUMP, NECK: ICD-10-CM

## 2022-07-25 DIAGNOSIS — Z00.8 ENCOUNTER FOR OTHER GENERAL EXAMINATION: ICD-10-CM

## 2022-07-25 PROCEDURE — 70491 CT SOFT TISSUE NECK W/DYE: CPT | Mod: 26

## 2022-07-25 PROCEDURE — 70491 CT SOFT TISSUE NECK W/DYE: CPT

## 2023-04-27 ENCOUNTER — APPOINTMENT (OUTPATIENT)
Dept: SURGICAL ONCOLOGY | Facility: CLINIC | Age: 53
End: 2023-04-27
Payer: COMMERCIAL

## 2023-04-27 VITALS
RESPIRATION RATE: 17 BRPM | WEIGHT: 202 LBS | BODY MASS INDEX: 33.66 KG/M2 | TEMPERATURE: 97.7 F | SYSTOLIC BLOOD PRESSURE: 128 MMHG | OXYGEN SATURATION: 98 % | HEIGHT: 65 IN | HEART RATE: 75 BPM | DIASTOLIC BLOOD PRESSURE: 84 MMHG

## 2023-04-27 DIAGNOSIS — R22.1 LOCALIZED SWELLING, MASS AND LUMP, NECK: ICD-10-CM

## 2023-04-27 PROCEDURE — 99214 OFFICE O/P EST MOD 30 MIN: CPT

## 2023-04-27 NOTE — HISTORY OF PRESENT ILLNESS
[de-identified] : 52 year-old lady\par \par Initially seen: September 2019.\par \par She was referred by orthopedics (below) for evaluation and management of a nonpalpable, asymptomatic, left retroclavicular mass identified on an MRI of the neck and shoulder, performed to evaluate shoulder pain.\par \par My PE:\par No visible or palpable abnormality in the area of the left clavicle.\par No tenderness.\par \par October 2019:\par Targeted ultrasound at 611:\par No mass or adenopathy.\par \par October 2019:\par CT neck 611:\par 1.7 x 0.9 x 1.0 cm soft tissue density, thought to represent a benign lymph node.\par \par December 2019:\par Neck MRI with contrast at 611:\par The above CT finding was not duplicated.\par \par June 2020:\par Follow-up CT neck at 450:\par Stable left retroclavicular lymph node.\par \par May 2021:\par Follow-up CT neck at 450: No change in left retroclavicular lymph node dating back to October 2019.\par \par \par April 2022:\par Follow-up visit.\par Remained asymptomatic.\par Physical examination was normal.\par \par \par Returns for annual follow-up today.\par \par CC:\par Today she is asymptomatic\par \par \par September 2019:\par She was referred by her orthopedist Dr. Collin JOYNER for evaluation of an incidental LEFT RETRO-CLAVICULAR MASS found as an incidental finding on an MRI of the Left neck and shoulder performed @ [facility]; study was being performed to evaluate left Shoulder pain.\par Findings of supraspinatus tendinosis with high-grade partial thickness articular surface insertional tear of the rotator cuff, and insertional infraspinatus tendinosis, with tendinosis of the biceps, and joint arthrosis of the acromioclavicular joint likely explain her symptoms\par \par No preceding injury,or straining.\par \par No constitutional signs or symptoms.\par \par No other specific areas of concern.\par No palpable lumps or bumps presently, or in the past, on self examination.\par \par \par No personal history of malignancy.\par \par +FH:\par A sister had lymphoma.\par \par Her mother had colon cancer.\par Her own colonoscopy was d done November 2021 by Dr. Bry Christian in [facility]\par \par No other relatives with a history of malignancy.\par \par \par PMD:\par Dr. Javy MURDOCK.\par She USUALLY sees Dr. Devonte RYAN\par \par No pacemaker or defibrillator.\par No anticoagulants.\par \par Cardiology: Dr. Andrew CARTER.\par History of atypical chest pain, palpitations.\par \par + Hypertension.\par Treated with amlodipine\par \par \par Her gynecologist is Dr. Wendy CADET in [place].\par March 2023 visit was unremarkable was unremarkable.\par \par Breast imaging his every fall in [place].\par November 2022 studies were unremarkable\par \par \par Colonoscopy:\par + FH: Mother had CRC.\par November 2021 colonoscopy was normal

## 2023-04-27 NOTE — ASSESSMENT
[FreeTextEntry1] : Clinically doing well.\par \par Asymptomatic.\par \par Referred in the fall 2019 with incidental, asymptomatic, left retroclavicular adenopathy.\par This has been stable on serial imaging exams.\par \par July 2022:\par Follow-up CT scan of the neck at 611:\par Stable, 1.9 x 1.0 cm left supraclavicular node, unchanged.\par \par Prescription entered to have the study repeated at another year interval, July 2023.\par \par If asymptomatic, with normal imaging, continue yearly follow-up.\par \par Reviewed in detail, all questions answered.

## 2023-04-27 NOTE — REVIEW OF SYSTEMS
[Negative] : Heme/Lymph [FreeTextEntry5] : History of palpitations [FreeTextEntry8] : Family history of CRC [de-identified] : Left retroclavicular mass

## 2023-04-27 NOTE — PHYSICAL EXAM
[Normal] : supple, no neck mass and thyroid not enlarged [Normal Neck Lymph Nodes] : normal neck lymph nodes  [Normal Supraclavicular Lymph Nodes] : normal supraclavicular lymph nodes [Normal Axillary Lymph Nodes] : normal axillary lymph nodes [Normal] : normal appearance, no rash, nodules, vesicles, ulcers, erythema [de-identified] : groins not examined

## 2023-06-02 ENCOUNTER — NON-APPOINTMENT (OUTPATIENT)
Age: 53
End: 2023-06-02

## 2023-07-13 ENCOUNTER — APPOINTMENT (OUTPATIENT)
Dept: UROLOGY | Facility: CLINIC | Age: 53
End: 2023-07-13
Payer: COMMERCIAL

## 2023-07-13 VITALS
SYSTOLIC BLOOD PRESSURE: 139 MMHG | TEMPERATURE: 98 F | BODY MASS INDEX: 34.99 KG/M2 | WEIGHT: 210 LBS | HEIGHT: 65 IN | DIASTOLIC BLOOD PRESSURE: 82 MMHG | HEART RATE: 69 BPM

## 2023-07-13 PROCEDURE — 99204 OFFICE O/P NEW MOD 45 MIN: CPT

## 2023-07-13 NOTE — ASSESSMENT
[FreeTextEntry1] : Recent gross hematuria, though with sx and urine culture urgent care confirmed 100k e coli, allison Sensitive\par now treated\par no sx\par \par D/w pt all options, risks, benefits at length.  Hematuria of concern but with causation; now ~ 1 month later\par \par will check u/a, micro, to determine if any microhematuria which requires w/u\par will arrange renal US to assess for any stone, obstruction\par will review u/a and culture once complete; if any microhematuria, will rec CT urogram and cysto instead of the renal/bladder US\par

## 2023-07-13 NOTE — HISTORY OF PRESENT ILLNESS
[None] : no symptoms [FreeTextEntry1] : COTY MONTANA is a 52 year F who presents for f/u eval after recent gross hematuria (~ 1 month), and sx of bladder pressure, urge, dysuria. Grossly bloody, went to urgent care: had course abx, and resolved quickly. No new issues, no further voiding sx. \par \par No frequent uti's, prior was 'many years ago'.\par \par No kidney stone history, no urologic history\par \par Voiding well- no dysuria, urge, or frequency\par \par PMH: htn\par PSH: partial hysterectomy, s/p appy\par FH: no sig  FH\par Meds: amlodipine \par SH: casual smoker in college, nothing since. third \par Allergies: NKDA\par

## 2023-07-14 LAB
APPEARANCE: CLEAR
BACTERIA: NEGATIVE /HPF
BILIRUBIN URINE: NEGATIVE
BLOOD URINE: NEGATIVE
CAST: 0 /LPF
COLOR: YELLOW
EPITHELIAL CELLS: 1 /HPF
GLUCOSE QUALITATIVE U: 500 MG/DL
KETONES URINE: NEGATIVE MG/DL
LEUKOCYTE ESTERASE URINE: ABNORMAL
MICROSCOPIC-UA: NORMAL
NITRITE URINE: NEGATIVE
PH URINE: 6.5
PROTEIN URINE: NEGATIVE MG/DL
RED BLOOD CELLS URINE: 0 /HPF
SPECIFIC GRAVITY URINE: 1.02
UROBILINOGEN URINE: 0.2 MG/DL
WHITE BLOOD CELLS URINE: 0 /HPF

## 2023-07-17 LAB — BACTERIA UR CULT: ABNORMAL

## 2023-07-18 ENCOUNTER — NON-APPOINTMENT (OUTPATIENT)
Age: 53
End: 2023-07-18

## 2023-07-21 ENCOUNTER — APPOINTMENT (OUTPATIENT)
Dept: ULTRASOUND IMAGING | Facility: CLINIC | Age: 53
End: 2023-07-21
Payer: COMMERCIAL

## 2023-07-21 PROCEDURE — 76770 US EXAM ABDO BACK WALL COMP: CPT

## 2023-07-22 LAB
APPEARANCE: CLEAR
BACTERIA: NEGATIVE /HPF
BILIRUBIN URINE: NEGATIVE
BLOOD URINE: NEGATIVE
CAST: 0 /LPF
COLOR: YELLOW
EPITHELIAL CELLS: 2 /HPF
GLUCOSE QUALITATIVE U: NEGATIVE MG/DL
KETONES URINE: NEGATIVE MG/DL
LEUKOCYTE ESTERASE URINE: ABNORMAL
MICROSCOPIC-UA: NORMAL
NITRITE URINE: NEGATIVE
PH URINE: 6.5
PROTEIN URINE: NEGATIVE MG/DL
RED BLOOD CELLS URINE: 2 /HPF
SPECIFIC GRAVITY URINE: 1.01
UROBILINOGEN URINE: 0.2 MG/DL
WHITE BLOOD CELLS URINE: 1 /HPF

## 2023-07-24 LAB — BACTERIA UR CULT: ABNORMAL

## 2023-07-24 RX ORDER — AMOXICILLIN AND CLAVULANATE POTASSIUM 875; 125 MG/1; MG/1
875-125 TABLET, COATED ORAL TWICE DAILY
Qty: 6 | Refills: 0 | Status: ACTIVE | COMMUNITY
Start: 2023-07-24 | End: 1900-01-01

## 2023-07-27 ENCOUNTER — APPOINTMENT (OUTPATIENT)
Dept: CT IMAGING | Facility: CLINIC | Age: 53
End: 2023-07-27
Payer: COMMERCIAL

## 2023-07-27 ENCOUNTER — OUTPATIENT (OUTPATIENT)
Dept: OUTPATIENT SERVICES | Facility: HOSPITAL | Age: 53
LOS: 1 days | End: 2023-07-27
Payer: COMMERCIAL

## 2023-07-27 DIAGNOSIS — R22.1 LOCALIZED SWELLING, MASS AND LUMP, NECK: ICD-10-CM

## 2023-07-27 PROCEDURE — 70491 CT SOFT TISSUE NECK W/DYE: CPT | Mod: 26

## 2023-07-27 PROCEDURE — 70491 CT SOFT TISSUE NECK W/DYE: CPT

## 2023-08-08 ENCOUNTER — APPOINTMENT (OUTPATIENT)
Dept: UROLOGY | Facility: CLINIC | Age: 53
End: 2023-08-08
Payer: COMMERCIAL

## 2023-08-08 DIAGNOSIS — N39.0 URINARY TRACT INFECTION, SITE NOT SPECIFIED: ICD-10-CM

## 2023-08-08 DIAGNOSIS — A49.9 URINARY TRACT INFECTION, SITE NOT SPECIFIED: ICD-10-CM

## 2023-08-08 DIAGNOSIS — Z87.898 PERSONAL HISTORY OF OTHER SPECIFIED CONDITIONS: ICD-10-CM

## 2023-08-08 PROCEDURE — 99441: CPT

## 2023-08-08 NOTE — HISTORY OF PRESENT ILLNESS
[Other Location: e.g. School (Enter Location, City,State)___] : at [unfilled], at the time of the visit. [Other Location: e.g. Home (Enter Location, City,State)___] : at [unfilled] [Verbal consent obtained from patient] : the patient, [unfilled] [FreeTextEntry1] : The patient-doctor relationship has been established in audio HIPAA compliant communication. The patient's identity has been confirmed. The patient was previously emailed a copy of the telemedicine consent. They have had a chance to review and has now given verbal consent and has requested care to be assessed and treated via telemedicine. They understand there may be limitations in this process, and that they may need further followup care in the office and/or hospital settings.  Verbal consent given on Aug  8 2023  9:40AM  COTY MONTANA is a 52 year F who presents for f/u review: had gross hematuria associated with +culture uti. Now rxed. F/u u/a with leuk est, but 1 wbc, 3 rbc. Culture post with strep agalactae, likely colonizer.  Renal/Bladder US reviewed: no stone, no hydro, no solid renal mass, bladder wnl. Hepatic steatosis noted.  Reviewed with pt- will f/u with PCP, who has apparently noted elevated lft's and ordered abd sonogram, per pt.  08/08/2023   The patient denies fevers, chills, nausea and/or vomiting, and no unexplained weight loss.  All pertinent parts of the patient PFSH (past medical, family, and social histories), laboratory, radiological studies and available physician notes were reviewed prior to starting the face-to-face portion of the telemedicine visit. Questionnaire results, where appropriate, were discussed with the patient.

## 2023-08-08 NOTE — ASSESSMENT
How Did The Hair Loss Occur?: gradual in onset [FreeTextEntry1] : Currently doing well. Offers no current complaints or issues.   Renal US findings reviewed  Pt wishes to f/u on routine- rtc 6 months with u/a at that time  will consider cysto if needed, but given resolution with treatment of uti, and no microhematuria, will observe How Severe Is Your Hair Loss?: moderate Additional History: Patient states her diet has changed over a year ago, she does not eat red meat and eats very little. She has lost a lot of weight as well. Her mother and grandmother have AGA. She does not take supplements.

## 2024-02-22 ENCOUNTER — APPOINTMENT (OUTPATIENT)
Dept: UROLOGY | Facility: CLINIC | Age: 54
End: 2024-02-22

## 2024-04-12 NOTE — ED ADULT NURSE NOTE - NS ED NURSE DISCH DISPOSITION
[facility]   OFFICE NOTE      Neeru Tim  1950  PCP: Kelley Nguyen MD    SUBJECTIVE:   Neeru Tim is a 74 y.o. female seen for a follow up visit regarding the following:     CC:Palpitations    HPI:   Pleasant 74 year old returns for follow up regarding palpitations and recent holter monitor revealing brief AT. She states since last seen by our office she has not had any change in her symptoms that is minimal . She denies any chest pain, sob or CHF symptoms. She did start B 12 shots and this has helped her energy. She has had some hip pain but does PT Rx. She has no dizziness near syncope or syncope.     Cardiac PMH: (Old records have been reviewed and summarized below)  Chest pain/shortness of breath  Negative MPS 2008  Echo 2008: normal EF with mild LAE  GXT Cardiolite 2012: no ischemia, normal EF  Recurrent chest pain spring 2021   Negative Stress test 7/2021 Normal EF 70%, No Ischemia   Palpitations  HOlter 12/14/2023: AT, Sinus Rhythm   Hypertension   Hyperlipidemia   Esophageal spasm diagnosed by Dr. lBane Gonzales   LE edema  Bilateral LE venous duplex April 2021: no evidence of DVT   DM2  Osteoarthritis   Varicose veins     Past Medical History, Past Surgical History, Family history, Social History, and Medications were all reviewed with the patient today and updated as necessary.       Current Outpatient Medications:     ammonium lactate (LAC-HYDRIN) 12 % lotion, Apply  topically to the appropriate area as directed As Needed., Disp: , Rfl:     aspirin 81 MG chewable tablet, aspirin 81 mg chewable tablet  Daily, Disp: , Rfl:     atorvastatin (LIPITOR) 10 MG tablet, 1 tablet Daily., Disp: , Rfl:     Blood Glucose Calibration (ACCU-CHEK MARÍA ELENA VI), Accu-Chek María Elena Plus test strips  TESTS BID  DX E11.9, Disp: , Rfl:     brimonidine (ALPHAGAN) 0.2 % ophthalmic solution, Administer 1 drop to both eyes 2 (Two) Times a Day., Disp: , Rfl:     Cyanocobalamin 1000  "MCG capsule, Daily., Disp: , Rfl:     DORZOLAMIDE HCL-TIMOLOL MAL OP, Administer 1 drop to both eyes 2 (Two) Times a Day. 22.3mg/6.8mg, Disp: , Rfl:     famotidine (PEPCID) 20 MG tablet, Take 1 tablet by mouth Daily., Disp: , Rfl:     hydroCHLOROthiazide (HYDRODIURIL) 12.5 MG tablet, Take 1 tablet by mouth Daily., Disp: , Rfl:     latanoprost (XALATAN) 0.005 % ophthalmic solution, Administer 1 drop to both eyes Every Night., Disp: , Rfl:     losartan (COZAAR) 100 MG tablet, Take 1 tablet by mouth Daily., Disp: , Rfl:     metFORMIN ER (GLUCOPHAGE-XR) 500 MG 24 hr tablet, Take 1 tablet by mouth 3 (Three) Times a Day. At dinner, Disp: , Rfl:     multivitamin with minerals (CENTRUM SILVER 50+WOMEN PO), Take 1 tablet by mouth Daily., Disp: , Rfl:     triamcinolone (KENALOG) 0.1 % ointment, As Needed., Disp: , Rfl:     metFORMIN (GLUCOPHAGE) 500 MG tablet, 3 tablets Daily With Dinner., Disp: , Rfl:     Probiotic Product (PROBIOTIC DAILY PO), Take  by mouth 2 (Two) Times a Day. (Patient not taking: Reported on 4/12/2024), Disp: , Rfl:       Allergies   Allergen Reactions    Ciprofloxacin Dizziness    Oxycodone-Acetaminophen Nausea Only     percocet         PHYSICAL EXAM:    /78 (BP Location: Left arm, Patient Position: Sitting)   Pulse 64   Ht 163.8 cm (64.5\")   Wt 65.8 kg (145 lb)   SpO2 99%   BMI 24.50 kg/m²        Wt Readings from Last 5 Encounters:   04/12/24 65.8 kg (145 lb)   02/20/24 64.4 kg (142 lb)   01/12/24 64.7 kg (142 lb 9.6 oz)   04/16/23 69.9 kg (154 lb)   06/03/21 72.6 kg (160 lb)       BP Readings from Last 5 Encounters:   04/12/24 122/78   02/20/24 144/73   01/12/24 140/68   04/16/23 151/79   06/03/21 154/89       General appearance - Alert, well appearing, and in no distress   Mental status - Affect appropriate to mood.  Eyes - Sclerae anicteric,  ENMT - Hearing grossly normal bilaterally, Dental hygiene good.  Neck - Carotids upstroke normal bilaterally, no bruits, no JVD.  Resp - Clear to " auscultation, no wheezes, rales or rhonchi, symmetric air entry.  Heart - Normal rate, regular rhythm, normal S1, S2, no murmurs, rubs, clicks or gallops.  GI - Soft, nontender, nondistended, no masses or organomegaly.  Neurological - Grossly intact - normal speech, no focal findings  Musculoskeletal - No joint tenderness, deformity or swelling, no muscular tenderness noted.  Extremities - Peripheral pulses normal, no pedal edema, no clubbing or cyanosis.  Skin - Normal coloration and turgor.  Psych -  oriented to person, place, and time.    Medical problems and test results were reviewed with the patient today.     No results found for this or any previous visit (from the past 672 hour(s)).      EKG: (EKG has been independently visualized by me and summarized below)      ASSESSMENT   1. Chest Pain, Negative Stress MPS 2021, No symptoms suggesting Angina.  Echocardiogram, EF 62% Mild TR, Mild PI. RVSP 37mmHG.     2. Palpitations: SVT-AT, Minimally symptomatic.     3. HTN: Controlled on HCTZ, Losartan.     4. HLD: Statin    PLAN  Overall stable course of SVT and minimal symptoms at this point.  Offered again option for beta-blocker she declined she would which she is taking currently if she has breakthrough episodes or she has sustained episodes she will call our office let us know.  She return for follow-up 1 year sooner as needed.            4/12/2024  Electronically signed by FRANCE Phipps, 04/12/24, 1:37 PM EDT.    none Discharged

## 2024-08-05 ENCOUNTER — APPOINTMENT (OUTPATIENT)
Dept: SURGICAL ONCOLOGY | Facility: CLINIC | Age: 54
End: 2024-08-05

## 2024-08-05 PROCEDURE — 99215 OFFICE O/P EST HI 40 MIN: CPT

## 2024-08-06 NOTE — PHYSICAL EXAM
[Normal] : supple, no neck mass and thyroid not enlarged [Normal Neck Lymph Nodes] : normal neck lymph nodes  [Normal Supraclavicular Lymph Nodes] : normal supraclavicular lymph nodes [Normal Axillary Lymph Nodes] : normal axillary lymph nodes [Normal] : normal appearance, no rash, nodules, vesicles, ulcers, erythema [de-identified] : groins not examined

## 2024-08-06 NOTE — REASON FOR VISIT
[Follow-Up Visit] : a follow-up visit for [Other: _____] : [unfilled] [FreeTextEntry2] : Left retroclavicular mass initially identified September 2019.

## 2024-08-06 NOTE — HISTORY OF PRESENT ILLNESS
[de-identified] : 53-year-old lady.  Follow-up visit for an incidental, asymptomatic, nonpalpable, stable, soft tissue mass in the left retroclavicular region.   Today she is asymptomatic.   Initially seen: September 2019.  She was referred by orthopedics (below) for evaluation and management of a nonpalpable, asymptomatic, left retro-clavicular mass identified on an MRI of the neck and shoulder, performed to evaluate shoulder pain.  My PE: No visible or palpable abnormality in the area of the left clavicle. No tenderness.  October 2019: Targeted ultrasound at 611: No mass or adenopathy.  October 2019: CT neck 611: 1.7 x 0.9 x 1.0 cm soft tissue density, thought to represent a benign lymph node.  December 2019: Neck MRI with contrast at 611: The above CT finding was not duplicated.  June 2020: Follow-up CT neck at 450: Stable left retro-clavicular lymph node.  May 2021: Follow-up CT neck at 450: No change in left retro-clavicular lymph node initially seen October 2019.   April 2023: Follow-up visit. Remained asymptomatic. Physical examination was normal.  Returns for annual follow-up today.   September 2019: She was referred by her orthopedist Dr. Collin JOYNER for evaluation of an incidental LEFT RETRO-CLAVICULAR MASS found as an incidental finding on an MRI of the Left neck and shoulder performed @ [facility]; study was being performed to evaluate left Shoulder pain. Findings of supraspinatus tendinosis with high-grade partial thickness articular surface insertional tear of the rotator cuff, and insertional infraspinatus tendinosis, with tendinosis of the biceps, and joint arthrosis of the acromioclavicular joint likely explain her symptoms  No preceding injury, or strain.  No constitutional signs or symptoms.  No other specific areas of concern. No palpable lumps or bumps presently, or in the past, on self-examination.   No personal history of malignancy.   +FH: A sister had lymphoma.  Her mother had colon cancer. Her own colonoscopy was d done November 2021 by Dr. Bry Christian in [facility]  No other relatives with a history of malignancy.   PMD: Currently sees a nonaffiliated physician in [place] but would like to see one of our primary care physicians. Cancer care connect contacted through secure email. She used to see Dr. Javy Jerez and her PA, Devonte RYAN.  NKDA.  + Allergic to dust mites and animal dander (cat).  No pacemaker or defibrillator. No anticoagulants.  Cardiology: Dr. Andrew CARTER. History of atypical chest pain, palpitations.  + Hypertension. Treated with amlodipine.  Summer 2023 she had an episode of gross hematuria. Saw urology: Dr. David HOENIG. Diagnosed and treated for a severe UTI with resolution of the bloody urine. She has a uterine and bladder prolapse. She will be seeing a urogynecologist: Dr. Ronaldo PAK in [facility].  Since a fall 2023 she has been having pain in her back and in her hands. Ortho/hand: Dr. Pancho REHMAN has diagnosed her with arthritis, and this is being treated conservatively. Ortho/spine: Dr. Orlando CAMERON is treating her for lumbar radiculopathy and sciatica, especially on the left.   Her gynecologist is Dr. Wendy CADET in [place]. Summer 2024 visit was unremarkable was unremarkable.  Breast imaging was in [place]. November 2023 studies were unremarkable. She would like to change to our health system. Prescription entered/provided for November 2024.   Colonoscopy: + FH: Mother had CRC. November 2021 colonoscopy was normal.

## 2024-08-06 NOTE — PHYSICAL EXAM
[Normal] : supple, no neck mass and thyroid not enlarged [Normal Neck Lymph Nodes] : normal neck lymph nodes  [Normal Supraclavicular Lymph Nodes] : normal supraclavicular lymph nodes [Normal Axillary Lymph Nodes] : normal axillary lymph nodes [Normal] : normal appearance, no rash, nodules, vesicles, ulcers, erythema [de-identified] : groins not examined

## 2024-08-06 NOTE — REVIEW OF SYSTEMS
[Negative] : Endocrine [FreeTextEntry5] : Hypertension [FreeTextEntry7] : Environmental allergies [FreeTextEntry9] : History of hematuria [de-identified] : Arthritis [de-identified] : Anxiety [FreeTextEntry1] : Retroclavicular adenopathy

## 2024-08-06 NOTE — HISTORY OF PRESENT ILLNESS
[de-identified] : 53-year-old lady.  Follow-up visit for an incidental, asymptomatic, nonpalpable, stable, soft tissue mass in the left retroclavicular region.   Today she is asymptomatic.   Initially seen: September 2019.  She was referred by orthopedics (below) for evaluation and management of a nonpalpable, asymptomatic, left retro-clavicular mass identified on an MRI of the neck and shoulder, performed to evaluate shoulder pain.  My PE: No visible or palpable abnormality in the area of the left clavicle. No tenderness.  October 2019: Targeted ultrasound at 611: No mass or adenopathy.  October 2019: CT neck 611: 1.7 x 0.9 x 1.0 cm soft tissue density, thought to represent a benign lymph node.  December 2019: Neck MRI with contrast at 611: The above CT finding was not duplicated.  June 2020: Follow-up CT neck at 450: Stable left retro-clavicular lymph node.  May 2021: Follow-up CT neck at 450: No change in left retro-clavicular lymph node initially seen October 2019.   April 2023: Follow-up visit. Remained asymptomatic. Physical examination was normal.  Returns for annual follow-up today.   September 2019: She was referred by her orthopedist Dr. Collin JOYNER for evaluation of an incidental LEFT RETRO-CLAVICULAR MASS found as an incidental finding on an MRI of the Left neck and shoulder performed @ [facility]; study was being performed to evaluate left Shoulder pain. Findings of supraspinatus tendinosis with high-grade partial thickness articular surface insertional tear of the rotator cuff, and insertional infraspinatus tendinosis, with tendinosis of the biceps, and joint arthrosis of the acromioclavicular joint likely explain her symptoms  No preceding injury, or strain.  No constitutional signs or symptoms.  No other specific areas of concern. No palpable lumps or bumps presently, or in the past, on self-examination.   No personal history of malignancy.   +FH: A sister had lymphoma.  Her mother had colon cancer. Her own colonoscopy was d done November 2021 by Dr. Bry Christian in [facility]  No other relatives with a history of malignancy.   PMD: Currently sees a nonaffiliated physician in [place] but would like to see one of our primary care physicians. Cancer care connect contacted through secure email. She used to see Dr. Javy Jerez and her PA, Devonte RYAN.  NKDA.  + Allergic to dust mites and animal dander (cat).  No pacemaker or defibrillator. No anticoagulants.  Cardiology: Dr. Andrew CARTER. History of atypical chest pain, palpitations.  + Hypertension. Treated with amlodipine.  Summer 2023 she had an episode of gross hematuria. Saw urology: Dr. David HOENIG. Diagnosed and treated for a severe UTI with resolution of the bloody urine. She has a uterine and bladder prolapse. She will be seeing a urogynecologist: Dr. Ronaldo PAK in [facility].  Since a fall 2023 she has been having pain in her back and in her hands. Ortho/hand: Dr. Pancho REHMAN has diagnosed her with arthritis, and this is being treated conservatively. Ortho/spine: Dr. Orlando CAMERON is treating her for lumbar radiculopathy and sciatica, especially on the left.   Her gynecologist is Dr. Wendy CADET in [place]. Summer 2024 visit was unremarkable was unremarkable.  Breast imaging was in [place]. November 2023 studies were unremarkable. She would like to change to our health system. Prescription entered/provided for November 2024.   Colonoscopy: + FH: Mother had CRC. November 2021 colonoscopy was normal.

## 2024-08-06 NOTE — REVIEW OF SYSTEMS
[Negative] : Endocrine [FreeTextEntry5] : Hypertension [FreeTextEntry7] : Environmental allergies [FreeTextEntry9] : History of hematuria [de-identified] : Anxiety [de-identified] : Arthritis [FreeTextEntry1] : Retroclavicular adenopathy

## 2024-08-06 NOTE — ASSESSMENT
[FreeTextEntry1] : 53-year-old lady.  Presents for annual follow-up with an asymptomatic nonpalpable left retroclavicular lymph node identified on diagnostic imaging for shoulder pain.  She remains asymptomatic with a normal physical examination.  A sister had lymphoma.  7/27/2023: CT chest with contrast at 611 demonstrated a stable left retroclavicular lymph node. Will repeat presently. Prescription entered/provided.  I also entered a prescription for her breast imaging to be done through our system in November 2024.  She is very anxious and asked for help, especially in the form of counseling. I provided her with the contact information for Dr. Asa Chance  If asymptomatic, without any interval changes and imaging, I have offered to see her again in another year, sooner if needed.  Reviewed in detail, all questions answered.

## 2024-08-07 ENCOUNTER — NON-APPOINTMENT (OUTPATIENT)
Age: 54
End: 2024-08-07

## 2024-08-17 ENCOUNTER — APPOINTMENT (OUTPATIENT)
Dept: CT IMAGING | Facility: CLINIC | Age: 54
End: 2024-08-17

## 2024-08-17 PROCEDURE — 71260 CT THORAX DX C+: CPT | Mod: 26

## 2025-02-12 ENCOUNTER — APPOINTMENT (OUTPATIENT)
Dept: INTERNAL MEDICINE | Facility: CLINIC | Age: 55
End: 2025-02-12

## 2025-08-09 ENCOUNTER — NON-APPOINTMENT (OUTPATIENT)
Age: 55
End: 2025-08-09

## 2025-08-11 ENCOUNTER — APPOINTMENT (OUTPATIENT)
Dept: SURGICAL ONCOLOGY | Facility: CLINIC | Age: 55
End: 2025-08-11
Payer: COMMERCIAL

## 2025-08-11 VITALS
DIASTOLIC BLOOD PRESSURE: 100 MMHG | HEART RATE: 99 BPM | HEIGHT: 65 IN | BODY MASS INDEX: 32.49 KG/M2 | OXYGEN SATURATION: 96 % | SYSTOLIC BLOOD PRESSURE: 150 MMHG | WEIGHT: 195 LBS

## 2025-08-11 DIAGNOSIS — C50.811 MALIGNANT NEOPLASM OF OVERLAPPING SITES OF RIGHT FEMALE BREAST: ICD-10-CM

## 2025-08-11 DIAGNOSIS — R22.1 LOCALIZED SWELLING, MASS AND LUMP, NECK: ICD-10-CM

## 2025-08-11 DIAGNOSIS — Z17.0 MALIGNANT NEOPLASM OF OVERLAPPING SITES OF RIGHT FEMALE BREAST: ICD-10-CM

## 2025-08-11 PROCEDURE — 99215 OFFICE O/P EST HI 40 MIN: CPT

## 2025-08-12 PROBLEM — C50.811 MALIGNANT NEOPLASM OF OVERLAPPING SITES OF RIGHT BREAST IN FEMALE, ESTROGEN RECEPTOR POSITIVE: Status: ACTIVE | Noted: 2025-08-12

## 2025-08-20 ENCOUNTER — RESULT REVIEW (OUTPATIENT)
Age: 55
End: 2025-08-20

## 2025-08-20 ENCOUNTER — OUTPATIENT (OUTPATIENT)
Dept: OUTPATIENT SERVICES | Facility: HOSPITAL | Age: 55
LOS: 1 days | End: 2025-08-20
Payer: COMMERCIAL

## 2025-08-20 DIAGNOSIS — C80.1 MALIGNANT (PRIMARY) NEOPLASM, UNSPECIFIED: ICD-10-CM

## 2025-08-20 PROCEDURE — 88321 CONSLTJ&REPRT SLD PREP ELSWR: CPT

## 2025-08-21 LAB — SURGICAL PATHOLOGY STUDY: SIGNIFICANT CHANGE UP

## 2025-08-22 ENCOUNTER — APPOINTMENT (OUTPATIENT)
Dept: CT IMAGING | Facility: IMAGING CENTER | Age: 55
End: 2025-08-22
Payer: COMMERCIAL

## 2025-08-22 ENCOUNTER — APPOINTMENT (OUTPATIENT)
Dept: MRI IMAGING | Facility: IMAGING CENTER | Age: 55
End: 2025-08-22
Payer: COMMERCIAL

## 2025-08-22 ENCOUNTER — OUTPATIENT (OUTPATIENT)
Dept: OUTPATIENT SERVICES | Facility: HOSPITAL | Age: 55
LOS: 1 days | End: 2025-08-22
Payer: COMMERCIAL

## 2025-08-22 DIAGNOSIS — C50.811 MALIGNANT NEOPLASM OF OVERLAPPING SITES OF RIGHT FEMALE BREAST: ICD-10-CM

## 2025-08-22 PROCEDURE — 71260 CT THORAX DX C+: CPT

## 2025-08-22 PROCEDURE — C8937: CPT

## 2025-08-22 PROCEDURE — 71260 CT THORAX DX C+: CPT | Mod: 26

## 2025-08-22 PROCEDURE — A9585: CPT

## 2025-08-22 PROCEDURE — 77049 MRI BREAST C-+ W/CAD BI: CPT | Mod: 26

## 2025-08-22 PROCEDURE — C8908: CPT

## 2025-09-04 ENCOUNTER — APPOINTMENT (OUTPATIENT)
Dept: PLASTIC SURGERY | Facility: CLINIC | Age: 55
End: 2025-09-04

## 2025-09-04 VITALS
WEIGHT: 195 LBS | OXYGEN SATURATION: 97 % | HEART RATE: 75 BPM | SYSTOLIC BLOOD PRESSURE: 139 MMHG | HEIGHT: 65 IN | DIASTOLIC BLOOD PRESSURE: 79 MMHG | TEMPERATURE: 98.1 F | BODY MASS INDEX: 32.49 KG/M2

## 2025-09-04 PROCEDURE — 99244 OFF/OP CNSLTJ NEW/EST MOD 40: CPT

## 2025-09-10 ENCOUNTER — APPOINTMENT (OUTPATIENT)
Dept: CT IMAGING | Facility: IMAGING CENTER | Age: 55
End: 2025-09-10
Payer: COMMERCIAL

## 2025-09-10 ENCOUNTER — APPOINTMENT (OUTPATIENT)
Dept: PLASTIC SURGERY | Facility: CLINIC | Age: 55
End: 2025-09-10
Payer: COMMERCIAL

## 2025-09-10 VITALS
BODY MASS INDEX: 28.34 KG/M2 | RESPIRATION RATE: 17 BRPM | TEMPERATURE: 97.6 F | WEIGHT: 187 LBS | HEIGHT: 68 IN | SYSTOLIC BLOOD PRESSURE: 142 MMHG | OXYGEN SATURATION: 97 % | HEART RATE: 76 BPM | DIASTOLIC BLOOD PRESSURE: 88 MMHG

## 2025-09-10 DIAGNOSIS — Z42.1 ENCOUNTER FOR BREAST RECONSTRUCTION FOLLOWING MASTECTOMY: ICD-10-CM

## 2025-09-10 DIAGNOSIS — C50.811 MALIGNANT NEOPLASM OF OVERLAPPING SITES OF RIGHT FEMALE BREAST: ICD-10-CM

## 2025-09-10 DIAGNOSIS — Z17.0 MALIGNANT NEOPLASM OF OVERLAPPING SITES OF RIGHT FEMALE BREAST: ICD-10-CM

## 2025-09-10 PROCEDURE — 74174 CTA ABD&PLVS W/CONTRAST: CPT | Mod: 26

## 2025-09-10 PROCEDURE — 99203 OFFICE O/P NEW LOW 30 MIN: CPT

## 2025-09-10 RX ORDER — CEFADROXIL 500 MG/1
500 CAPSULE ORAL TWICE DAILY
Qty: 6 | Refills: 0 | Status: COMPLETED | COMMUNITY
Start: 2025-09-10 | End: 2025-09-13

## 2025-09-10 RX ORDER — CYCLOBENZAPRINE HYDROCHLORIDE 5 MG/1
5 TABLET, FILM COATED ORAL 3 TIMES DAILY
Qty: 15 | Refills: 0 | Status: ACTIVE | COMMUNITY
Start: 2025-09-10 | End: 1900-01-01

## 2025-09-10 RX ORDER — OXYCODONE 5 MG/1
5 TABLET ORAL EVERY 6 HOURS
Qty: 12 | Refills: 0 | Status: ACTIVE | COMMUNITY
Start: 2025-09-10 | End: 1900-01-01

## 2025-09-10 RX ORDER — KRILL/OM-3/DHA/EPA/PHOSPHO/AST 1000-230MG
81 CAPSULE ORAL DAILY
Qty: 30 | Refills: 0 | Status: ACTIVE | COMMUNITY
Start: 2025-09-10 | End: 1900-01-01

## 2025-09-13 ENCOUNTER — APPOINTMENT (OUTPATIENT)
Dept: CT IMAGING | Facility: IMAGING CENTER | Age: 55
End: 2025-09-13
Payer: COMMERCIAL

## 2025-09-13 PROCEDURE — 70491 CT SOFT TISSUE NECK W/DYE: CPT | Mod: 26

## 2025-09-19 ENCOUNTER — TRANSCRIPTION ENCOUNTER (OUTPATIENT)
Age: 55
End: 2025-09-19